# Patient Record
Sex: FEMALE | Race: WHITE | HISPANIC OR LATINO | URBAN - METROPOLITAN AREA
[De-identification: names, ages, dates, MRNs, and addresses within clinical notes are randomized per-mention and may not be internally consistent; named-entity substitution may affect disease eponyms.]

---

## 2017-10-18 ENCOUNTER — APPOINTMENT (RX ONLY)
Dept: URBAN - METROPOLITAN AREA CLINIC 127 | Facility: CLINIC | Age: 27
Setting detail: DERMATOLOGY
End: 2017-10-18

## 2017-10-18 DIAGNOSIS — L71.8 OTHER ROSACEA: ICD-10-CM

## 2017-10-18 DIAGNOSIS — L24 IRRITANT CONTACT DERMATITIS: ICD-10-CM

## 2017-10-18 DIAGNOSIS — D22 MELANOCYTIC NEVI: ICD-10-CM

## 2017-10-18 DIAGNOSIS — L91.8 OTHER HYPERTROPHIC DISORDERS OF THE SKIN: ICD-10-CM

## 2017-10-18 PROBLEM — L24.9 IRRITANT CONTACT DERMATITIS, UNSPECIFIED CAUSE: Status: ACTIVE | Noted: 2017-10-18

## 2017-10-18 PROBLEM — J45.909 UNSPECIFIED ASTHMA, UNCOMPLICATED: Status: ACTIVE | Noted: 2017-10-18

## 2017-10-18 PROBLEM — D22.61 MELANOCYTIC NEVI OF RIGHT UPPER LIMB, INCLUDING SHOULDER: Status: ACTIVE | Noted: 2017-10-18

## 2017-10-18 PROCEDURE — ? COUNSELING

## 2017-10-18 PROCEDURE — 99202 OFFICE O/P NEW SF 15 MIN: CPT

## 2017-10-18 PROCEDURE — ? PRESCRIPTION

## 2017-10-18 PROCEDURE — ? TREATMENT REGIMEN

## 2017-10-18 RX ORDER — IVERMECTIN 10 MG/G
CREAM TOPICAL QHS
Qty: 45 | Refills: 1 | Status: ERX | COMMUNITY
Start: 2017-10-18

## 2017-10-18 RX ORDER — CICLOPIROX 7.7 MG/G
GEL TOPICAL BID
Qty: 45 | Refills: 0 | Status: ERX | COMMUNITY
Start: 2017-10-18

## 2017-10-18 RX ORDER — DOXYCYCLINE 40 MG/1
CAPSULE ORAL QD
Qty: 30 | Refills: 2 | Status: ERX | COMMUNITY
Start: 2017-10-18

## 2017-10-18 RX ADMIN — CICLOPIROX: 7.7 GEL TOPICAL at 18:13

## 2017-10-18 RX ADMIN — DOXYCYCLINE: 40 CAPSULE ORAL at 18:17

## 2017-10-18 RX ADMIN — IVERMECTIN: 10 CREAM TOPICAL at 18:17

## 2017-10-18 ASSESSMENT — LOCATION SIMPLE DESCRIPTION DERM
LOCATION SIMPLE: LEFT BREAST
LOCATION SIMPLE: RIGHT WRIST

## 2017-10-18 ASSESSMENT — LOCATION DETAILED DESCRIPTION DERM
LOCATION DETAILED: LEFT AXILLARY TAIL OF BREAST
LOCATION DETAILED: RIGHT LATERAL DORSAL WRIST

## 2017-10-18 ASSESSMENT — LOCATION ZONE DERM
LOCATION ZONE: TRUNK
LOCATION ZONE: ARM

## 2017-10-18 NOTE — PROCEDURE: TREATMENT REGIMEN
Detail Level: Zone
Continue Regimen: OTC cleanser as long as they don’t dry out her skin
Plan: Redness possibly due to the area causing heat & moister
Initiate Treatment: Ciclopirox gel bid \\nWill try meditation until the holidays to see if it improves if not we can try something else

## 2017-10-18 NOTE — HPI: PIMPLES (ACNE)
How Severe Is Your Acne?: moderate
Is This A New Presentation, Or A Follow-Up?: Acne
Females Only: When Was Your Last Menstrual Period?: 9/27/2017